# Patient Record
Sex: FEMALE | Race: OTHER | URBAN - METROPOLITAN AREA
[De-identification: names, ages, dates, MRNs, and addresses within clinical notes are randomized per-mention and may not be internally consistent; named-entity substitution may affect disease eponyms.]

---

## 2018-07-09 NOTE — PATIENT DISCUSSION
Patient educated with the Basic option and a near vision goal, there is no guarantee of achieving near vision without glasses after surgery. They will most likely need prescription glasses at all focal points. Patient elects Basic OD, goal of -2.00.

## 2021-10-13 ENCOUNTER — IMPORTED ENCOUNTER (OUTPATIENT)
Dept: URBAN - METROPOLITAN AREA CLINIC 1 | Facility: CLINIC | Age: 76
End: 2021-10-13

## 2021-10-13 PROBLEM — H25.813: Noted: 2021-10-13

## 2021-10-13 PROBLEM — Z79.4: Noted: 2021-10-13

## 2021-10-13 PROBLEM — H25.043: Noted: 2021-10-13

## 2021-10-13 PROBLEM — E11.3293: Noted: 2021-10-13

## 2021-10-13 PROCEDURE — 92015 DETERMINE REFRACTIVE STATE: CPT

## 2021-10-13 PROCEDURE — 99204 OFFICE O/P NEW MOD 45 MIN: CPT

## 2021-10-13 NOTE — PATIENT DISCUSSION
1.  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 Stressed the importance of strong glucose control. Advised of importance of at least yearly dilated examinations but to contact us immediately for any problems or concerns. 2. Combined Cataract OU w/ PSC OU - Visually Significant but will hold on sx at this time until cleared by retina. Refer to Dr. Chaitanya Santos for evaluation of NPDR OU and to clear patient for cataract surgery. Return for an appointment in 10 after appointment with Dr. Chaitanya Santos with Dr. Ana Copeland.

## 2021-10-13 NOTE — PATIENT DISCUSSION
Cataract OU:  Visually Significant discussed the risks benefits alternatives and limitations of cataract surgery. The patient stated a full understanding and a desire to proceed with the procedure. The patient will need cataract measurements and to have any additional questions answered and start pre-operative eye drops as directed. Phaco PCLOtherwise follow-up

## 2021-10-13 NOTE — PATIENT DISCUSSION
Combined Cataract OU w/ PSC OU - Visually Significant but will hold on sx at this time until cleared by retina.

## 2021-11-12 ENCOUNTER — IMPORTED ENCOUNTER (OUTPATIENT)
Dept: URBAN - METROPOLITAN AREA CLINIC 1 | Facility: CLINIC | Age: 76
End: 2021-11-12

## 2021-11-12 PROBLEM — H25.813: Noted: 2021-11-12

## 2021-11-12 PROCEDURE — 99213 OFFICE O/P EST LOW 20 MIN: CPT

## 2021-11-12 NOTE — PATIENT DISCUSSION
1.  Cataract OU -- Visually Significant secondary to glare discussed the risks benefits alternatives and limitations of cataract surgery. The patient stated a full understanding and a desire to proceed with the procedure. The patient will need to return for preop appointment with cataract measurements and to have any additional questions answered and start pre-operative eye drops as directed. Patient cleared by retina to proceed with PCL. Phaco PCL OS then ODOtherwise follow-up 6 month 10/DFE/Glare 2. 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 Waldo Major for injections. Return for an appointment for Ascan/H and P. with Dr. Mariana San.

## 2021-12-22 ENCOUNTER — IMPORTED ENCOUNTER (OUTPATIENT)
Dept: URBAN - METROPOLITAN AREA CLINIC 1 | Facility: CLINIC | Age: 76
End: 2021-12-22

## 2021-12-22 PROBLEM — H25.813: Noted: 2021-12-22

## 2021-12-22 PROCEDURE — 92136 OPHTHALMIC BIOMETRY: CPT

## 2021-12-22 NOTE — PATIENT DISCUSSION
1.  Cataract OU - Visually Significant secondary to glare discussed the risks benefits alternatives and limitations of cataract surgery. The patient stated a full understanding and a desire to proceed with the procedure. Start pre-operative eye drops as directed. Patient cleared by retina to proceed with PCL. Pt understands they will need glasses post-op to achieve their best corrected vision. Phaco PCL OS then ODReturn for an appointment in [address] [place] [address] with Dr. Iraj Almazan.

## 2022-01-04 ENCOUNTER — IMPORTED ENCOUNTER (OUTPATIENT)
Dept: URBAN - METROPOLITAN AREA CLINIC 1 | Facility: CLINIC | Age: 77
End: 2022-01-04

## 2022-01-04 PROBLEM — Z96.1: Noted: 2022-01-04

## 2022-01-04 PROBLEM — Z96.1: Noted: 2022-01-18

## 2022-01-05 ENCOUNTER — IMPORTED ENCOUNTER (OUTPATIENT)
Dept: URBAN - METROPOLITAN AREA CLINIC 1 | Facility: CLINIC | Age: 77
End: 2022-01-05

## 2022-01-05 PROBLEM — Z96.1: Noted: 2022-01-05

## 2022-01-05 PROCEDURE — 99024 POSTOP FOLLOW-UP VISIT: CPT

## 2022-01-05 NOTE — PATIENT DISCUSSION
1. POD#1 CE/IOL OS (Standard) doing well but with corneal edema seen today. Start OTC Aura 128 5% gtts QID OS until seen backUse Prednisolone QID OS Acular QID OS Ocuflox QID OS : Use all three gtts through completion of PO gtt chart regimen/ Per our instructions given to patient.   Post op Warnings Reiterated RTC as scheduled

## 2022-01-12 ENCOUNTER — IMPORTED ENCOUNTER (OUTPATIENT)
Dept: URBAN - METROPOLITAN AREA CLINIC 1 | Facility: CLINIC | Age: 77
End: 2022-01-12

## 2022-01-12 PROBLEM — H25.811: Noted: 2022-01-12

## 2022-01-12 PROCEDURE — 92136 OPHTHALMIC BIOMETRY: CPT

## 2022-01-12 NOTE — PATIENT DISCUSSION
1.  Cataract OD -- Visually Significant secondary to glare discussed the risks benefits alternatives and limitations of cataract surgery. The patient stated a full understanding and a desire to proceed with the procedure. Discussed with patient if PO Gtts are more than $120 for all three combined when filling at their Pharmacy please call our office to request generic substitutions. Phaco PCL OD 2. POW#1  CE/IOL OS (Standard) doing well. Use Prednisolone BID OS & Acular BID OS: Use through completion of po gtt regimen. Hold Aura gtts OS.  F/u as scheduled 2nd eye

## 2022-01-18 ENCOUNTER — IMPORTED ENCOUNTER (OUTPATIENT)
Dept: URBAN - METROPOLITAN AREA CLINIC 1 | Facility: CLINIC | Age: 77
End: 2022-01-18

## 2022-01-19 ENCOUNTER — IMPORTED ENCOUNTER (OUTPATIENT)
Dept: URBAN - METROPOLITAN AREA CLINIC 1 | Facility: CLINIC | Age: 77
End: 2022-01-19

## 2022-01-19 ENCOUNTER — PREPPED CHART (OUTPATIENT)
Dept: URBAN - METROPOLITAN AREA CLINIC 2 | Facility: CLINIC | Age: 77
End: 2022-01-19

## 2022-01-19 PROBLEM — Z96.1: Noted: 2022-01-19

## 2022-01-19 PROCEDURE — 99024 POSTOP FOLLOW-UP VISIT: CPT

## 2022-01-19 NOTE — PATIENT DISCUSSION
1. POD#1 Phaco/ PCL OD (Standard) - doing well. Use Prednisolone QID OD Ketorolac QID OD Ocuflox QID OD : Use all three gtts through completion of PO gtt chart regimen/ Per our instructions given. Post op Warnings Reiterated 2.  POW#3 Phaco/ PCL OS (Standard) - doing well Acular QID OS and Pred QID until goneRTC as scheduled

## 2022-01-19 NOTE — PATIENT DISCUSSION
Use pred QID OD, Ketorolac QID OD, Ocuflox QID OD: use all three gtts through completion of PO gtt chart regimen. PO warnings reiterated.

## 2022-02-10 ASSESSMENT — VISUAL ACUITY
OS_SC: 20/30
OD_SC: 20/25

## 2022-02-10 ASSESSMENT — TONOMETRY
OS_IOP_MMHG: 15
OD_IOP_MMHG: 15

## 2022-02-11 ENCOUNTER — POST-OP (OUTPATIENT)
Dept: URBAN - METROPOLITAN AREA CLINIC 2 | Facility: CLINIC | Age: 77
End: 2022-02-11

## 2022-02-11 DIAGNOSIS — Z96.1: ICD-10-CM

## 2022-02-11 PROCEDURE — 99024 POSTOP FOLLOW-UP VISIT: CPT

## 2022-02-11 ASSESSMENT — KERATOMETRY
OS_AXISANGLE2_DEGREES: 111
OS_K1POWER_DIOPTERS: 45.00
OD_AXISANGLE_DEGREES: 177
OD_K1POWER_DIOPTERS: 44.50
OS_AXISANGLE_DEGREES: 021
OD_K2POWER_DIOPTERS: 45.75
OD_AXISANGLE2_DEGREES: 87
OS_K2POWER_DIOPTERS: 45.75

## 2022-02-11 ASSESSMENT — VISUAL ACUITY
OS_SC: 20/40-2
OD_SC: 20/30-1

## 2022-04-02 ASSESSMENT — VISUAL ACUITY
OD_GLARE: 20/50
OD_CC: 20/25
OS_CC: 20/40-1
OD_GLARE: 20/60
OS_GLARE: 20/60
OD_CC: 20/50+1
OS_GLARE: 20/50
OD_SC: 20/40-2
OS_CC: 20/60-2
OS_CC: 20/30
OS_CC: 20/150
OS_CC: 20/60
OD_GLARE: 20/60
OD_CC: 20/70
OD_CC: 20/70

## 2022-04-02 ASSESSMENT — KERATOMETRY
OS_K1POWER_DIOPTERS: 44.50
OS_AXISANGLE_DEGREES: 009
OS_AXISANGLE2_DEGREES: 099
OS_K2POWER_DIOPTERS: 46.75

## 2022-04-02 ASSESSMENT — TONOMETRY
OS_IOP_MMHG: 14
OD_IOP_MMHG: 14
OS_IOP_MMHG: 14
OD_IOP_MMHG: 14
OS_IOP_MMHG: 15
OD_IOP_MMHG: 15

## 2024-02-28 NOTE — PATIENT DISCUSSION
MARIELLA suprep letter printed and mailed.   Patient advised to call if any problems, questions, or concerns.